# Patient Record
Sex: FEMALE | Race: WHITE | URBAN - NONMETROPOLITAN AREA
[De-identification: names, ages, dates, MRNs, and addresses within clinical notes are randomized per-mention and may not be internally consistent; named-entity substitution may affect disease eponyms.]

---

## 2018-07-02 ENCOUNTER — OFFICE VISIT (OUTPATIENT)
Dept: PSYCHIATRY | Age: 23
End: 2018-07-02
Payer: COMMERCIAL

## 2018-07-02 VITALS
OXYGEN SATURATION: 100 % | HEART RATE: 67 BPM | WEIGHT: 160.9 LBS | SYSTOLIC BLOOD PRESSURE: 116 MMHG | HEIGHT: 63 IN | BODY MASS INDEX: 28.51 KG/M2 | DIASTOLIC BLOOD PRESSURE: 74 MMHG

## 2018-07-02 DIAGNOSIS — F41.0 PANIC ATTACK: ICD-10-CM

## 2018-07-02 DIAGNOSIS — F41.9 ANXIETY: Primary | ICD-10-CM

## 2018-07-02 DIAGNOSIS — F32.A DEPRESSION, UNSPECIFIED DEPRESSION TYPE: ICD-10-CM

## 2018-07-02 DIAGNOSIS — F41.0 PANIC DISORDER: ICD-10-CM

## 2018-07-02 DIAGNOSIS — F32.9 MAJOR DEPRESSIVE DISORDER WITH SINGLE EPISODE, REMISSION STATUS UNSPECIFIED: ICD-10-CM

## 2018-07-02 PROCEDURE — 99204 OFFICE O/P NEW MOD 45 MIN: CPT | Performed by: CLINICAL NURSE SPECIALIST

## 2018-07-02 RX ORDER — LORAZEPAM 0.5 MG/1
0.5 TABLET ORAL PRN
Refills: 0 | COMMUNITY
Start: 2018-06-13 | End: 2019-02-22 | Stop reason: ALTCHOICE

## 2018-07-02 RX ORDER — FLUTICASONE PROPIONATE 50 MCG
50 SPRAY, SUSPENSION (ML) NASAL DAILY
COMMUNITY
Start: 2018-02-07

## 2018-07-02 RX ORDER — FLUOXETINE 10 MG/1
10 CAPSULE ORAL DAILY
COMMUNITY
End: 2018-07-02 | Stop reason: SDUPTHER

## 2018-07-02 RX ORDER — FLUOXETINE 10 MG/1
10 CAPSULE ORAL DAILY
Qty: 30 CAPSULE | Refills: 2 | Status: SHIPPED | OUTPATIENT
Start: 2018-07-02 | End: 2018-08-24 | Stop reason: SDUPTHER

## 2018-07-02 ASSESSMENT — PATIENT HEALTH QUESTIONNAIRE - PHQ9
10. IF YOU CHECKED OFF ANY PROBLEMS, HOW DIFFICULT HAVE THESE PROBLEMS MADE IT FOR YOU TO DO YOUR WORK, TAKE CARE OF THINGS AT HOME, OR GET ALONG WITH OTHER PEOPLE: 1
9. THOUGHTS THAT YOU WOULD BE BETTER OFF DEAD, OR OF HURTING YOURSELF: 0
3. TROUBLE FALLING OR STAYING ASLEEP: 1
8. MOVING OR SPEAKING SO SLOWLY THAT OTHER PEOPLE COULD HAVE NOTICED. OR THE OPPOSITE, BEING SO FIGETY OR RESTLESS THAT YOU HAVE BEEN MOVING AROUND A LOT MORE THAN USUAL: 0
4. FEELING TIRED OR HAVING LITTLE ENERGY: 3
2. FEELING DOWN, DEPRESSED OR HOPELESS: 1
SUM OF ALL RESPONSES TO PHQ9 QUESTIONS 1 & 2: 3
5. POOR APPETITE OR OVEREATING: 2
SUM OF ALL RESPONSES TO PHQ QUESTIONS 1-9: 12
1. LITTLE INTEREST OR PLEASURE IN DOING THINGS: 2
6. FEELING BAD ABOUT YOURSELF - OR THAT YOU ARE A FAILURE OR HAVE LET YOURSELF OR YOUR FAMILY DOWN: 1
7. TROUBLE CONCENTRATING ON THINGS, SUCH AS READING THE NEWSPAPER OR WATCHING TELEVISION: 2

## 2018-07-02 NOTE — PROGRESS NOTES
knew about this. His suicide has been a significant family stressor. Patient is  to [facility] for 4 years, no children. SUBSTANCE USE/ABUSE:   Tobacco: denied   Vaping: denied   Marijuana: daily use, several times a day, amount varies   ETOH: rare   Street/recreational drugs: denied    PSYCHIATRIC HISTORY: no inpatient or outpatient episodes of care    PREVIOUS MED TRIALS: Celexa (didn't work, apparently mother had genetic testing which reflected Celexa is not likely to work for her family). Zoloft made her feel blah, not much emotoin, have a weight gain. Was on Xanax for one week. FAMILY PSYCHIATRIC HISTORY:  Both sisters from maternal line have drug problems, possible MH problems. Both parents are/were depressed secondary to their son's death. Review of Systems - 12 point review negative today except pending appointment with cardiologist for mitral valve prolapse  No reported history of seizures and/or head trauma. See past medical history below. Information obtained via patient and chart review  PCP is  Roshan [facility]    Allergies: Quinolones    Prior to Admission medications    Medication Sig Start Date End Date Taking? Authorizing Provider   fluticasone (FLONASE) 50 MCG/ACT nasal spray 50 sprays by Nasal route daily 2/7/18  Yes Historical Provider, MD   loratadine-pseudoephedrine (CLARITIN-D 12HR) 5-120 MG per extended release tablet Take 120 tablets by mouth daily 2/7/18  Yes Historical Provider, MD   LORazepam (ATIVAN) 0.5 MG tablet Take 0.5 mg by mouth as needed. . 6/13/18  Yes Historical Provider, MD   FLUoxetine (PROZAC) 10 MG capsule Take 1 capsule by mouth daily 7/2/18  Yes YUMI Celeste - CNP       History reviewed. No pertinent past medical history.     Past Surgical History:   Procedure Laterality Date    DILATION AND CURETTAGE OF UTERUS      OVARY REMOVAL      SINUS SURGERY           Family History   Problem Relation Age of Onset    Anxiety Disorder Mother     Depression Mother     Depression Father        Social History  Born and Raised -   Education: High school graduate  Employment[de-identified]  in [employer]. Has also been  in hospital    Trauma and/or Abuse -  None reported     Legal - none reported     status - none      MENTAL STATUS EXAMINATION  Patient is  A & O x3. Appearance:  well-appearing, in chair and good grooming appropriately dressed for age and season. Cognition:  Recent memory intact , remote memory intact , excellent fund of knowledge,  average intelligence level. Speech:  normal no evidence of language or speech disorder or defect. Language: Naming: intact; Word Finding: intact fluent. Conversation:no evidence of delusions  Behavior:  Cooperative  Mood: depressed and anxious  Affect: congruent with mood  Dangerousness to self/others (current, history)::  Thought Content: negative delusions, hallucinations, homicidal and suicidal  No evidence of psychotic or delusional thoughts. Thought Process: linear and goal directed  Judgement Insight:  normal and appropriate   Gait and Station:normal gait and station                         Musculoskeletal: WNL    ASSESSMENT  1. Anxiety, r/o generalized anxiety disorder  2. Depression, unspecified, mild to moderate  3. Panic attack by history        PLAN    Patient has just started fluoxetine 10 mg po daily; dose increase should be considered next visit. No side effects reported. 1. The risks, benefits, side effects, indications, contraindications, and adverse effects of the medications have been discussed. Yes.  2. The pt has verbalized understanding and has capacity to give informed consent. 3. The [facility] report has been reviewed according to [facility] regulations. 4. Supportive therapy offered. Referred to talk therapist  5. Follow up: Return in about 1 month (around 8/2/2018). 6. The patient has been advised to call with any problems.   7. Controlled substance Treatment Plan: this is a

## 2018-08-03 ENCOUNTER — OFFICE VISIT (OUTPATIENT)
Dept: PSYCHIATRY | Age: 23
End: 2018-08-03
Payer: COMMERCIAL

## 2018-08-03 VITALS
HEIGHT: 63 IN | BODY MASS INDEX: 28.93 KG/M2 | OXYGEN SATURATION: 100 % | WEIGHT: 163.3 LBS | HEART RATE: 63 BPM | SYSTOLIC BLOOD PRESSURE: 118 MMHG | DIASTOLIC BLOOD PRESSURE: 67 MMHG

## 2018-08-03 DIAGNOSIS — F32.A DEPRESSION, UNSPECIFIED DEPRESSION TYPE: ICD-10-CM

## 2018-08-03 DIAGNOSIS — F41.9 ANXIETY: Primary | ICD-10-CM

## 2018-08-03 PROCEDURE — 90791 PSYCH DIAGNOSTIC EVALUATION: CPT | Performed by: COUNSELOR

## 2018-08-03 NOTE — PROGRESS NOTES
Initial Session Note  [facility] OF DIANNA WIGGINS  8/3/2018  11:21 AM      Time spent with Patient: 60 minutes  This is patient's first  Therapy appointment. Reason for Consult:  anxiety and stress  Referring Provider: No referring provider defined for this encounter. Pt provided informed consent for the behavioral health program. Discussed with patient model of service to include the limits of confidentiality (i.e. abuse reporting, suicide intervention, etc.) and short-term intervention focused approach. Discussed no show and late cancellation policy. Pt indicated understanding. Connor Meza Shoulder daughter) ,a 24 y.o. female, for initial evaluation visit. Reason:    Pt states she's been through a lot and has struggled with depression and anxiety. Has never been in therapy, \"put it off for as long as I could\" but states she's ready to be happy. Pt states she doesn't feel depressed recently but has high anxiety off and on. Believes the Prozac to be beneficial. It was making her really tired at first but the fatigue is starting to wear off the longer she's on it. Pt denies SI, HI and AVH at this time. Social History:  Born and raised in [address]. Pt has 4 half sisters a little brother. Pt is  and does not have any children. Current Medications:  Scheduled Meds:   Current Outpatient Prescriptions:     Multiple Vitamin (MULTI-VITAMIN DAILY PO), Take 1 tablet by mouth daily, Disp: , Rfl:     fluticasone (FLONASE) 50 MCG/ACT nasal spray, 50 sprays by Nasal route daily, Disp: , Rfl:     loratadine-pseudoephedrine (CLARITIN-D 12HR) 5-120 MG per extended release tablet, Take 120 tablets by mouth daily, Disp: , Rfl:     LORazepam (ATIVAN) 0.5 MG tablet, Take 0.5 mg by mouth as needed. ., Disp: , Rfl: 0    FLUoxetine (PROZAC) 10 MG capsule, Take 1 capsule by mouth daily, Disp: 30 capsule, Rfl: 2      History:     Past Psychiatric History:   Previous therapy:

## 2018-08-24 ENCOUNTER — OFFICE VISIT (OUTPATIENT)
Dept: PSYCHIATRY | Age: 23
End: 2018-08-24
Payer: COMMERCIAL

## 2018-08-24 DIAGNOSIS — F41.0 PANIC DISORDER: ICD-10-CM

## 2018-08-24 DIAGNOSIS — F32.A DEPRESSION, UNSPECIFIED DEPRESSION TYPE: ICD-10-CM

## 2018-08-24 DIAGNOSIS — F41.9 ANXIETY: ICD-10-CM

## 2018-08-24 PROCEDURE — 99214 OFFICE O/P EST MOD 30 MIN: CPT | Performed by: CLINICAL NURSE SPECIALIST

## 2018-08-24 RX ORDER — FLUOXETINE 10 MG/1
10 CAPSULE ORAL DAILY
Qty: 30 CAPSULE | Refills: 2 | Status: SHIPPED | OUTPATIENT
Start: 2018-08-24 | End: 2018-11-30 | Stop reason: SDUPTHER

## 2018-08-24 NOTE — PROGRESS NOTES
8/24/2018 11:08 AM   Progress Note        Linnette Gonzales 1995  Psychotherapy Time Spent: 20 min      Psychotherapy Topics: health    PCP IS: Viri Acuña MD        Subjective:  Patient is a 23 yo   female diagnosed with anxiety and depression and presents today for follow-up. Last seen in clinic on 7/2/18 and prior records were reviewed. History obtained via chart review and patient    CHIEF COMPLAINT:    Today patient states, \"All right. 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 Good family. 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 Now is bartending only on weekend. \"it's a little better. I'm ready to see what school will bring me next week. \"    Doing well with prozac, \"tiredness is getting better. \" Can get hot very easily    Now had only a couple of days she didn't want to get out of bed and do anything. Patient reports side effects as follows: easily sweating with exertion. Suicide Ideation Absent      Reports compliance with medications as good . Review of Systems - 12 point review negative except headaches    Depression rated as 3 on 0-->10 VAS  Anxiety rated as 3 0-->10 VAS    Current Meds:    Prior to Admission medications    Medication Sig Start Date End Date Taking? Authorizing Provider   Multiple Vitamin (MULTI-VITAMIN DAILY PO) Take 1 tablet by mouth daily    Historical Provider, MD   fluticasone (FLONASE) 50 MCG/ACT nasal spray 50 sprays by Nasal route daily 2/7/18   Historical Provider, MD   loratadine-pseudoephedrine (CLARITIN-D 12HR) 5-120 MG per extended release tablet Take 120 tablets by mouth daily 2/7/18   Historical Provider, MD   LORazepam (ATIVAN) 0.5 MG tablet Take 0.5 mg by mouth as needed. . 6/13/18   Historical Provider, MD   FLUoxetine (PROZAC) 10 MG capsule Take 1 capsule by mouth daily 7/2/18   YUMI You - CNP     Hs not

## 2018-10-05 ENCOUNTER — OFFICE VISIT (OUTPATIENT)
Dept: PSYCHIATRY | Age: 23
End: 2018-10-05
Payer: COMMERCIAL

## 2018-10-05 DIAGNOSIS — F33.1 MODERATE EPISODE OF RECURRENT MAJOR DEPRESSIVE DISORDER (HCC): Primary | ICD-10-CM

## 2018-10-05 DIAGNOSIS — F41.9 ANXIETY: ICD-10-CM

## 2018-10-05 PROCEDURE — 90832 PSYTX W PT 30 MINUTES: CPT | Performed by: COUNSELOR

## 2018-11-30 ENCOUNTER — OFFICE VISIT (OUTPATIENT)
Dept: PSYCHIATRY | Age: 23
End: 2018-11-30
Payer: COMMERCIAL

## 2018-11-30 VITALS
DIASTOLIC BLOOD PRESSURE: 73 MMHG | BODY MASS INDEX: 30.87 KG/M2 | WEIGHT: 174.2 LBS | SYSTOLIC BLOOD PRESSURE: 130 MMHG | HEIGHT: 63 IN

## 2018-11-30 DIAGNOSIS — G47.00 INSOMNIA, UNSPECIFIED TYPE: ICD-10-CM

## 2018-11-30 DIAGNOSIS — F41.9 ANXIETY: ICD-10-CM

## 2018-11-30 DIAGNOSIS — F41.0 PANIC DISORDER: ICD-10-CM

## 2018-11-30 DIAGNOSIS — F32.A DEPRESSION, UNSPECIFIED DEPRESSION TYPE: Primary | ICD-10-CM

## 2018-11-30 PROCEDURE — 99214 OFFICE O/P EST MOD 30 MIN: CPT | Performed by: CLINICAL NURSE SPECIALIST

## 2018-11-30 RX ORDER — FLUOXETINE 10 MG/1
10 CAPSULE ORAL DAILY
Qty: 30 CAPSULE | Refills: 2 | Status: SHIPPED | OUTPATIENT
Start: 2018-11-30 | End: 2019-02-22 | Stop reason: SDUPTHER

## 2018-11-30 RX ORDER — TRAZODONE HYDROCHLORIDE 50 MG/1
50 TABLET ORAL NIGHTLY
Qty: 90 TABLET | Refills: 1 | Status: SHIPPED | OUTPATIENT
Start: 2018-11-30 | End: 2019-02-22 | Stop reason: SDUPTHER

## 2018-11-30 RX ORDER — BUPROPION HYDROCHLORIDE 150 MG/1
150 TABLET ORAL EVERY MORNING
Qty: 30 TABLET | Refills: 5 | Status: SHIPPED | OUTPATIENT
Start: 2018-11-30 | End: 2019-02-22 | Stop reason: SDUPTHER

## 2018-12-07 ENCOUNTER — OFFICE VISIT (OUTPATIENT)
Dept: PSYCHIATRY | Age: 23
End: 2018-12-07
Payer: COMMERCIAL

## 2018-12-07 VITALS
SYSTOLIC BLOOD PRESSURE: 122 MMHG | BODY MASS INDEX: 30.83 KG/M2 | HEART RATE: 66 BPM | DIASTOLIC BLOOD PRESSURE: 60 MMHG | WEIGHT: 174 LBS | HEIGHT: 63 IN | OXYGEN SATURATION: 98 %

## 2018-12-07 DIAGNOSIS — F41.1 GAD (GENERALIZED ANXIETY DISORDER): ICD-10-CM

## 2018-12-07 DIAGNOSIS — F33.1 MODERATE EPISODE OF RECURRENT MAJOR DEPRESSIVE DISORDER (HCC): Primary | ICD-10-CM

## 2018-12-07 PROCEDURE — 90834 PSYTX W PT 45 MINUTES: CPT | Performed by: COUNSELOR

## 2019-01-18 ENCOUNTER — OFFICE VISIT (OUTPATIENT)
Dept: PSYCHIATRY | Age: 24
End: 2019-01-18
Payer: COMMERCIAL

## 2019-01-18 DIAGNOSIS — F33.1 MODERATE EPISODE OF RECURRENT MAJOR DEPRESSIVE DISORDER (HCC): Primary | ICD-10-CM

## 2019-01-18 DIAGNOSIS — F41.1 GAD (GENERALIZED ANXIETY DISORDER): ICD-10-CM

## 2019-01-18 PROCEDURE — 90832 PSYTX W PT 30 MINUTES: CPT | Performed by: COUNSELOR

## 2019-02-22 ENCOUNTER — OFFICE VISIT (OUTPATIENT)
Dept: PSYCHIATRY | Age: 24
End: 2019-02-22
Payer: COMMERCIAL

## 2019-02-22 DIAGNOSIS — F41.9 ANXIETY: ICD-10-CM

## 2019-02-22 DIAGNOSIS — F32.A DEPRESSION, UNSPECIFIED DEPRESSION TYPE: ICD-10-CM

## 2019-02-22 DIAGNOSIS — F41.0 PANIC DISORDER: ICD-10-CM

## 2019-02-22 PROCEDURE — 99214 OFFICE O/P EST MOD 30 MIN: CPT | Performed by: CLINICAL NURSE SPECIALIST

## 2019-02-22 RX ORDER — FLUOXETINE 10 MG/1
10 CAPSULE ORAL DAILY
Qty: 30 CAPSULE | Refills: 2 | Status: SHIPPED | OUTPATIENT
Start: 2019-02-22

## 2019-02-22 RX ORDER — TRAZODONE HYDROCHLORIDE 50 MG/1
50 TABLET ORAL NIGHTLY
Qty: 90 TABLET | Refills: 1 | Status: SHIPPED | OUTPATIENT
Start: 2019-02-22

## 2019-02-22 RX ORDER — BUPROPION HYDROCHLORIDE 150 MG/1
150 TABLET ORAL EVERY MORNING
Qty: 30 TABLET | Refills: 5 | Status: SHIPPED | OUTPATIENT
Start: 2019-02-22

## 2024-11-01 ENCOUNTER — TELEPHONE (OUTPATIENT)
Dept: OBGYN CLINIC | Age: 29
End: 2024-11-01

## 2024-11-01 NOTE — TELEPHONE ENCOUNTER
Called patient because we received referral in office. Patient thought she was being referred to [facility]. Patient stated she will give us a call back.